# Patient Record
Sex: FEMALE | Race: WHITE | NOT HISPANIC OR LATINO | Employment: FULL TIME | URBAN - METROPOLITAN AREA
[De-identification: names, ages, dates, MRNs, and addresses within clinical notes are randomized per-mention and may not be internally consistent; named-entity substitution may affect disease eponyms.]

---

## 2023-08-26 PROBLEM — I49.1 PAC (PREMATURE ATRIAL CONTRACTION): Status: ACTIVE | Noted: 2023-08-26

## 2023-08-26 PROBLEM — K76.89 LIVER CYST: Status: ACTIVE | Noted: 2023-08-26

## 2023-08-26 PROBLEM — K76.9 LIVER LESION: Status: ACTIVE | Noted: 2023-08-26

## 2023-08-26 PROBLEM — L83 ACANTHOSIS NIGRICANS: Status: ACTIVE | Noted: 2023-08-26

## 2023-08-26 PROBLEM — R63.1 INCREASED THIRST: Status: ACTIVE | Noted: 2023-08-26

## 2023-08-26 PROBLEM — R73.01 IMPAIRED FASTING GLUCOSE: Status: ACTIVE | Noted: 2023-08-26

## 2023-08-26 PROBLEM — R07.89 CHEST WALL PAIN: Status: ACTIVE | Noted: 2023-08-26

## 2023-08-26 PROBLEM — J31.0 RHINITIS: Status: ACTIVE | Noted: 2023-08-26

## 2023-08-26 PROBLEM — I10 HYPERTENSION: Status: ACTIVE | Noted: 2023-08-26

## 2023-08-26 PROBLEM — E24.9 HYPERCORTISOLISM (MULTI): Status: ACTIVE | Noted: 2023-08-26

## 2023-08-26 PROBLEM — F41.9 ANXIETY: Status: ACTIVE | Noted: 2023-08-26

## 2023-08-26 PROBLEM — N30.10 INTERSTITIAL CYSTITIS: Status: ACTIVE | Noted: 2023-08-26

## 2023-08-26 PROBLEM — K21.9 GASTROESOPHAGEAL REFLUX DISEASE: Status: ACTIVE | Noted: 2023-08-26

## 2023-08-26 PROBLEM — E55.9 VITAMIN D DEFICIENCY: Status: ACTIVE | Noted: 2023-08-26

## 2023-08-26 PROBLEM — E78.5 HYPERLIPIDEMIA: Status: ACTIVE | Noted: 2023-08-26

## 2023-08-26 PROBLEM — E06.3 HASHIMOTO'S DISEASE: Status: ACTIVE | Noted: 2023-08-26

## 2023-08-26 PROBLEM — K76.0 NONALCOHOLIC FATTY LIVER: Status: ACTIVE | Noted: 2023-08-26

## 2023-08-26 PROBLEM — E11.9 DIABETES MELLITUS WITHOUT COMPLICATION (MULTI): Status: ACTIVE | Noted: 2023-08-26

## 2023-08-26 PROBLEM — R00.2 PALPITATIONS: Status: ACTIVE | Noted: 2023-08-26

## 2023-08-26 PROBLEM — E03.9 HYPOTHYROID: Status: ACTIVE | Noted: 2023-08-26

## 2023-08-26 PROBLEM — G47.00 INSOMNIA: Status: ACTIVE | Noted: 2023-08-26

## 2023-08-26 PROBLEM — G47.33 OBSTRUCTIVE SLEEP APNEA SYNDROME: Status: ACTIVE | Noted: 2023-08-26

## 2023-08-26 PROBLEM — D72.829 LEUKOCYTOSIS: Status: ACTIVE | Noted: 2023-08-26

## 2023-08-26 PROBLEM — E88.819 INSULIN RESISTANCE: Status: ACTIVE | Noted: 2023-08-26

## 2023-08-26 PROBLEM — E66.9 OBESITY: Status: ACTIVE | Noted: 2023-08-26

## 2023-08-26 PROBLEM — E28.2 PCOS (POLYCYSTIC OVARIAN SYNDROME): Status: ACTIVE | Noted: 2023-08-26

## 2023-08-26 RX ORDER — TRAZODONE HYDROCHLORIDE 50 MG/1
50 TABLET ORAL NIGHTLY
COMMUNITY
End: 2024-05-02 | Stop reason: SDUPTHER

## 2023-08-26 RX ORDER — THYROID 15 MG/1
1 TABLET ORAL DAILY
COMMUNITY
End: 2024-02-21 | Stop reason: SDUPTHER

## 2023-08-26 RX ORDER — METFORMIN HYDROCHLORIDE 500 MG/1
1000 TABLET, EXTENDED RELEASE ORAL DAILY
COMMUNITY
End: 2023-11-06

## 2023-08-26 RX ORDER — THYROID 30 MG/1
30 TABLET ORAL DAILY
COMMUNITY
End: 2024-02-21 | Stop reason: SDUPTHER

## 2023-08-26 RX ORDER — LEVONORGESTREL/ETHIN.ESTRADIOL 0.1-0.02MG
1 TABLET ORAL DAILY
COMMUNITY
Start: 2020-11-06 | End: 2023-10-20 | Stop reason: SDUPTHER

## 2023-08-26 RX ORDER — HYDROXYZINE HYDROCHLORIDE 10 MG/1
10 TABLET, FILM COATED ORAL NIGHTLY
COMMUNITY
End: 2024-05-02 | Stop reason: SDUPTHER

## 2023-08-26 RX ORDER — PROPRANOLOL HYDROCHLORIDE 20 MG/1
20 TABLET ORAL 2 TIMES DAILY
COMMUNITY
End: 2023-11-06

## 2023-08-26 RX ORDER — ZOLPIDEM TARTRATE 10 MG/1
10 TABLET ORAL NIGHTLY
COMMUNITY
End: 2023-10-20 | Stop reason: WASHOUT

## 2023-08-26 RX ORDER — OXYBUTYNIN CHLORIDE 10 MG/1
10 TABLET, EXTENDED RELEASE ORAL DAILY
COMMUNITY
Start: 2022-11-04 | End: 2023-12-02

## 2023-08-26 RX ORDER — SEMAGLUTIDE 2.68 MG/ML
2 INJECTION, SOLUTION SUBCUTANEOUS
COMMUNITY
Start: 2023-02-10 | End: 2024-05-02 | Stop reason: SDUPTHER

## 2023-08-26 RX ORDER — TRIAMCINOLONE ACETONIDE 1 MG/G
1 CREAM TOPICAL 2 TIMES DAILY
COMMUNITY
Start: 2019-04-05 | End: 2024-05-02 | Stop reason: ALTCHOICE

## 2023-10-18 NOTE — PROGRESS NOTES
Annual  Subjective   Danica Gonzalez is a 32 y.o. female who is here for a routine exam.     Complaints: none  Periods: regular  Dysmenorrhea: none    Current contraception: OCP  History of abnormal Pap smear: no  History of abnormal mammogram: no      OB History    No obstetric history on file.          Review of Systems   Constitutional:  Negative for chills, fatigue, fever and unexpected weight change.   Respiratory:  Negative for cough and shortness of breath.    Gastrointestinal:  Negative for abdominal pain, nausea and vomiting.   Genitourinary:  Negative for dyspareunia, dysuria, pelvic pain and vaginal discharge.   Skin:  Negative for color change and rash.   Neurological:  Negative for dizziness and headaches.         Objective   There were no vitals taken for this visit.       General:   Alert and oriented, in no acute distress   Neck: Supple. No visible thyromegaly.    Breast/Axilla: Normal to palpation bilaterally without masses, skin changes, or nipple discharge.    Abdomen: Soft, non-tender, without masses or organomegaly   Vulva: Normal architecture without erythema, masses, or lesions.    Vagina: Normal mucosa without lesions, masses, or atrophy. No abnormal vaginal discharge.    Cervix: Normal without masses, lesions, or signs of cervicitis   Uterus: Normal, mobile, non-enlarged uterus   Adnexa: Normal without masses or lesions   Pelvic Floor normal   Psych Normal affect. Normal mood.      Assessment/Plan   Diagnoses and all orders for this visit:  Encounter for gynecological examination without abnormal finding    Routine annual    Pap not due    Juliann Crespo MD    hand

## 2023-10-20 ENCOUNTER — OFFICE VISIT (OUTPATIENT)
Dept: OBSTETRICS AND GYNECOLOGY | Facility: CLINIC | Age: 33
End: 2023-10-20
Payer: COMMERCIAL

## 2023-10-20 VITALS
SYSTOLIC BLOOD PRESSURE: 112 MMHG | WEIGHT: 143 LBS | BODY MASS INDEX: 23.82 KG/M2 | HEIGHT: 65 IN | DIASTOLIC BLOOD PRESSURE: 64 MMHG

## 2023-10-20 DIAGNOSIS — Z01.419 ENCOUNTER FOR GYNECOLOGICAL EXAMINATION WITHOUT ABNORMAL FINDING: Primary | ICD-10-CM

## 2023-10-20 PROCEDURE — 99395 PREV VISIT EST AGE 18-39: CPT | Performed by: OBSTETRICS & GYNECOLOGY

## 2023-10-20 PROCEDURE — 3078F DIAST BP <80 MM HG: CPT | Performed by: OBSTETRICS & GYNECOLOGY

## 2023-10-20 PROCEDURE — 3074F SYST BP LT 130 MM HG: CPT | Performed by: OBSTETRICS & GYNECOLOGY

## 2023-10-20 PROCEDURE — 1036F TOBACCO NON-USER: CPT | Performed by: OBSTETRICS & GYNECOLOGY

## 2023-10-20 RX ORDER — LEVONORGESTREL/ETHIN.ESTRADIOL 0.1-0.02MG
1 TABLET ORAL DAILY
Qty: 90 TABLET | Refills: 3 | Status: SHIPPED | OUTPATIENT
Start: 2023-10-20 | End: 2024-10-19

## 2023-10-20 ASSESSMENT — ENCOUNTER SYMPTOMS
SHORTNESS OF BREATH: 0
ABDOMINAL PAIN: 0
FATIGUE: 0
DYSURIA: 0
COUGH: 0
NAUSEA: 0
CHILLS: 0
DIZZINESS: 0
HEADACHES: 0
VOMITING: 0
FEVER: 0
UNEXPECTED WEIGHT CHANGE: 0
COLOR CHANGE: 0

## 2023-10-20 ASSESSMENT — PATIENT HEALTH QUESTIONNAIRE - PHQ9
2. FEELING DOWN, DEPRESSED OR HOPELESS: NOT AT ALL
SUM OF ALL RESPONSES TO PHQ9 QUESTIONS 1 & 2: 0
1. LITTLE INTEREST OR PLEASURE IN DOING THINGS: NOT AT ALL

## 2023-10-20 ASSESSMENT — LIFESTYLE VARIABLES
HOW OFTEN DO YOU HAVE A DRINK CONTAINING ALCOHOL: 2-4 TIMES A MONTH
AUDIT-C TOTAL SCORE: 3
SKIP TO QUESTIONS 9-10: 0
HOW OFTEN DO YOU HAVE SIX OR MORE DRINKS ON ONE OCCASION: NEVER
HOW MANY STANDARD DRINKS CONTAINING ALCOHOL DO YOU HAVE ON A TYPICAL DAY: 3 OR 4

## 2023-10-20 ASSESSMENT — PAIN SCALES - GENERAL: PAINLEVEL: 0-NO PAIN

## 2023-10-20 NOTE — LETTER
October 20, 2023     Patient: Danica Gonzalez   YOB: 1990   Date of Visit: 10/20/2023       To Whom It May Concern:    Danica Gonzalez was seen in my clinic on 10/20/2023 at 1:30 pm. Please excuse Danica for her absence from work on this day to make the appointment.    If you have any questions or concerns, please don't hesitate to call.         Sincerely,         Juliann Crespo MD        CC: No Recipients

## 2023-10-26 ENCOUNTER — APPOINTMENT (OUTPATIENT)
Dept: OBSTETRICS AND GYNECOLOGY | Facility: CLINIC | Age: 33
End: 2023-10-26
Payer: COMMERCIAL

## 2024-01-24 DIAGNOSIS — R73.9 HYPERGLYCEMIA, UNSPECIFIED: ICD-10-CM

## 2024-01-25 ENCOUNTER — APPOINTMENT (OUTPATIENT)
Dept: HEMATOLOGY/ONCOLOGY | Facility: CLINIC | Age: 34
End: 2024-01-25
Payer: COMMERCIAL

## 2024-01-25 RX ORDER — METFORMIN HYDROCHLORIDE 500 MG/1
1000 TABLET, EXTENDED RELEASE ORAL 2 TIMES DAILY
Qty: 360 TABLET | Refills: 1 | Status: SHIPPED | OUTPATIENT
Start: 2024-01-25 | End: 2024-02-21 | Stop reason: WASHOUT

## 2024-01-29 DIAGNOSIS — D72.829 LEUKOCYTOSIS, UNSPECIFIED TYPE: Primary | ICD-10-CM

## 2024-02-01 ENCOUNTER — APPOINTMENT (OUTPATIENT)
Dept: LAB | Facility: CLINIC | Age: 34
End: 2024-02-01
Payer: COMMERCIAL

## 2024-02-01 ENCOUNTER — OFFICE VISIT (OUTPATIENT)
Dept: HEMATOLOGY/ONCOLOGY | Facility: CLINIC | Age: 34
End: 2024-02-01
Payer: COMMERCIAL

## 2024-02-01 VITALS
HEART RATE: 84 BPM | OXYGEN SATURATION: 97 % | RESPIRATION RATE: 18 BRPM | SYSTOLIC BLOOD PRESSURE: 100 MMHG | BODY MASS INDEX: 22.31 KG/M2 | WEIGHT: 134.04 LBS | DIASTOLIC BLOOD PRESSURE: 71 MMHG | TEMPERATURE: 97.3 F

## 2024-02-01 DIAGNOSIS — D72.829 LEUKOCYTOSIS, UNSPECIFIED TYPE: Primary | ICD-10-CM

## 2024-02-01 LAB
BASOPHILS # BLD AUTO: 0.05 X10*3/UL (ref 0–0.1)
BASOPHILS NFR BLD AUTO: 0.6 %
EOSINOPHIL # BLD AUTO: 0.14 X10*3/UL (ref 0–0.7)
EOSINOPHIL NFR BLD AUTO: 1.6 %
ERYTHROCYTE [DISTWIDTH] IN BLOOD BY AUTOMATED COUNT: 13.2 % (ref 11.5–14.5)
HCT VFR BLD AUTO: 40.3 % (ref 36–46)
HGB BLD-MCNC: 13.4 G/DL (ref 12–16)
IMM GRANULOCYTES # BLD AUTO: 0.02 X10*3/UL (ref 0–0.7)
IMM GRANULOCYTES NFR BLD AUTO: 0.2 % (ref 0–0.9)
LYMPHOCYTES # BLD AUTO: 2.35 X10*3/UL (ref 1.2–4.8)
LYMPHOCYTES NFR BLD AUTO: 27 %
MCH RBC QN AUTO: 29.5 PG (ref 26–34)
MCHC RBC AUTO-ENTMCNC: 33.3 G/DL (ref 32–36)
MCV RBC AUTO: 89 FL (ref 80–100)
MONOCYTES # BLD AUTO: 0.69 X10*3/UL (ref 0.1–1)
MONOCYTES NFR BLD AUTO: 7.9 %
NEUTROPHILS # BLD AUTO: 5.44 X10*3/UL (ref 1.2–7.7)
NEUTROPHILS NFR BLD AUTO: 62.7 %
NRBC BLD-RTO: 0 /100 WBCS (ref 0–0)
PLATELET # BLD AUTO: 222 X10*3/UL (ref 150–450)
RBC # BLD AUTO: 4.55 X10*6/UL (ref 4–5.2)
WBC # BLD AUTO: 8.7 X10*3/UL (ref 4.4–11.3)

## 2024-02-01 PROCEDURE — 1036F TOBACCO NON-USER: CPT | Performed by: NURSE PRACTITIONER

## 2024-02-01 PROCEDURE — 36415 COLL VENOUS BLD VENIPUNCTURE: CPT | Performed by: NURSE PRACTITIONER

## 2024-02-01 PROCEDURE — 3078F DIAST BP <80 MM HG: CPT | Performed by: NURSE PRACTITIONER

## 2024-02-01 PROCEDURE — 3074F SYST BP LT 130 MM HG: CPT | Performed by: NURSE PRACTITIONER

## 2024-02-01 PROCEDURE — 99213 OFFICE O/P EST LOW 20 MIN: CPT | Performed by: NURSE PRACTITIONER

## 2024-02-01 PROCEDURE — 85025 COMPLETE CBC W/AUTO DIFF WBC: CPT | Performed by: NURSE PRACTITIONER

## 2024-02-01 RX ORDER — VIT C/E/ZN/COPPR/LUTEIN/ZEAXAN 250MG-90MG
25 CAPSULE ORAL DAILY
COMMUNITY

## 2024-02-01 ASSESSMENT — PAIN SCALES - GENERAL: PAINLEVEL: 0-NO PAIN

## 2024-02-01 ASSESSMENT — ENCOUNTER SYMPTOMS
OCCASIONAL FEELINGS OF UNSTEADINESS: 0
LOSS OF SENSATION IN FEET: 0
DEPRESSION: 0

## 2024-02-01 ASSESSMENT — PATIENT HEALTH QUESTIONNAIRE - PHQ9
SUM OF ALL RESPONSES TO PHQ9 QUESTIONS 1 AND 2: 0
2. FEELING DOWN, DEPRESSED OR HOPELESS: NOT AT ALL
1. LITTLE INTEREST OR PLEASURE IN DOING THINGS: NOT AT ALL

## 2024-02-01 ASSESSMENT — COLUMBIA-SUICIDE SEVERITY RATING SCALE - C-SSRS
1. IN THE PAST MONTH, HAVE YOU WISHED YOU WERE DEAD OR WISHED YOU COULD GO TO SLEEP AND NOT WAKE UP?: NO
2. HAVE YOU ACTUALLY HAD ANY THOUGHTS OF KILLING YOURSELF?: NO

## 2024-02-01 NOTE — PROGRESS NOTES
Patient ID: Danica Gonzalez is a 33 y.o. female.    Hematology    Leukocytosis:   - WBC 13.7 with an absolute neutrophil count of 9.69 and the absolute eosinophil count was 0.61   - WBC 13.4 with an absolute neutrophil count of 9.10 and eosinophil count at 1.0.   2020 - WBC 15.2 with an absolute neutrophil count at 10.1 and eosinophil count at 0.86.   2020 - WBC 12 with an absolute neutrophil count of 7.87.  2020 - WBC 11.5 but otherwise with normal neutrophils, lymphocytes eosinophils, monocytes and basophils.  2022 - WBC 15.4 neutrophil 10.26 otherwise WNL  2023 - WBC 12.2 neutrophil 8.52 otherwise WNL  2024 - WBC 8.7 ANC 5440 (CBCd WNL)     Interval History:  Patient presents today for routine follow up evaluation for history of leukocytosis.  We have reviewed her counts dating back to .  No recent infections. She  has not had any hospitalization.  She denies any fever, chills or night sweats.  No cough, chest pain or shortness of breath.  No nausea or vomiting.  No constipation or diarrhea.     Review of Systems:  A review of systems has been completed and are negative for complaints except what is stated in the HPI and/or past medical history    Past Medical History:  Tobacco abuse (quit 2020 now smoking socially), hypertension, anxiety, hypothyroidism, insomnia, PCOS. COVID 2021.    Past Surgical History:  Tonsillectomy     Allergies:   NKDA    Medications:  Medication list reviewed with patient and updated in EMR    Social History:   The patient quit smoking in 2020 after 15 years. She drinks alcohol occasionally   social smoking weekends.  vapes    Vital Signs:   /71 (BP Location: Left arm, Patient Position: Sitting, BP Cuff Size: Adult long)   Pulse 84   Temp 36.3 °C (97.3 °F) (Temporal)   Resp 18   Wt 60.8 kg (134 lb 0.6 oz)   SpO2 97%   BMI 22.31 kg/m²     Physical Exam:  ECO  Pain: 0  Constitutional: Well developed,  awake/alert/oriented x3, no distress, alert and cooperative  Eyes: PER. sclera anicteric  ENMT: Oral mucosa moist  Respiratory/Thorax: Breathing is non-labored. Lungs are clear to auscultation bilaterally. No adventitious breath sounds  Cardiovascular: S1-S2. Regular rate and rhythm. No murmurs, rubs, or gallops appreciated  Gastrointestinal: Abdomen soft nontender, nondistended, normal active bowel sounds.   Musculoskeletal: ROM intact, no joint swelling, normal strength  Extremities: normal extremities, no cyanosis, no edema, no clubbing  Neurologic: alert and oriented x3. Nonfocal exam. No myoclonus  Psychological: Pleasant, appropriate and easily engaged     Lab Results:  February 1, 2024   WBC 8.7, hemoglobin 13.4, hematocrit 40.3, platelets 222,000, ANC 5440     Assessment:  Chronic leukocytosis  - currently WNL     History of tobacco abuse/use    Plan:   - 1 year follow-up  - CBCd on return     ANUM Garcia-CNP

## 2024-02-12 ENCOUNTER — APPOINTMENT (OUTPATIENT)
Dept: PRIMARY CARE | Facility: CLINIC | Age: 34
End: 2024-02-12
Payer: COMMERCIAL

## 2024-02-12 ENCOUNTER — LAB (OUTPATIENT)
Dept: LAB | Facility: LAB | Age: 34
End: 2024-02-12
Payer: COMMERCIAL

## 2024-02-12 DIAGNOSIS — E06.3 AUTOIMMUNE THYROIDITIS: Primary | ICD-10-CM

## 2024-02-12 LAB
T3FREE SERPL-MCNC: 3.4 PG/ML (ref 2.3–4.2)
TSH SERPL-ACNC: 3.44 MIU/L (ref 0.44–3.98)

## 2024-02-12 PROCEDURE — 36415 COLL VENOUS BLD VENIPUNCTURE: CPT

## 2024-02-12 PROCEDURE — 84481 FREE ASSAY (FT-3): CPT

## 2024-02-12 PROCEDURE — 84443 ASSAY THYROID STIM HORMONE: CPT

## 2024-02-20 NOTE — PROGRESS NOTES
HPI   33 yo with hashimoto's disease here for yearly f/u.           Pt been on synthetic thyroid hormone in the past, transitioned to dessicated thyroid hormone and felt better. Has gone back and forth with pcp and endo's over the years to regulate.           Taking armour 30mg + 15mg, normal labs see below.   -no obstructive sx, euthyroid by history   -remains on ocp     -taking ozempic 2mg (wt nearly 240lbs, today 134lbs)  -having normal periods, still takes metformin and not always tolerating it        Current Outpatient Medications:     cholecalciferol (Vitamin D-3) 25 MCG (1000 UT) capsule, Take 1 capsule (25 mcg) by mouth once daily., Disp: , Rfl:     hydrOXYzine HCL (Atarax) 10 mg tablet, Take 1 tablet (10 mg) by mouth once daily at bedtime., Disp: , Rfl:     levonorgestreL-ethinyl estrad ([place]) 0.1-20 mg-mcg tablet, Take 1 tablet by mouth once daily., Disp: 90 tablet, Rfl: 3    oxybutynin XL (Ditropan-XL) 10 mg 24 hr tablet, Take 1 tablet (10 mg) by mouth once daily., Disp: 90 tablet, Rfl: 3    semaglutide (Ozempic) 2 mg/dose (8 mg/3 mL) pen injector, Take 2 mg by mouth 1 (one) time per week., Disp: , Rfl:     thyroid, pork, ([place] Thyroid) 15 mg tablet, Take 1 tablet (15 mg) by mouth once daily. on an empty stomach, Disp: , Rfl:     thyroid, pork, ([place] Thyroid) 30 mg tablet, Take 1 tablet (30 mg) by mouth once daily. on an empty stomach, Disp: , Rfl:     traZODone (Desyrel) 50 mg tablet, Take 1 tablet (50 mg) by mouth once daily at bedtime., Disp: , Rfl:     triamcinolone (Kenalog) 0.1 % cream, Apply 1 Application topically 2 times a day., Disp: , Rfl:     vitamin E mixed (VITAMIN E COMPLEX ORAL), Vitamin E Complex, Disp: , Rfl:     Xiidra 5 % dropperette, INSTILL 1 DROP IN THE RIGHT EYE TWICE DAILY, Disp: , Rfl:     propranolol (Inderal) 20 mg tablet, TAKE 1 TABLET BY MOUTH TWICE A DAY FOR 90 DAYS, Disp: 180 tablet, Rfl: 3      Allergies as of 02/21/2024 - Reviewed 02/21/2024   Allergen Reaction  "Noted    Buspirone Anxiety 08/26/2023         Review of Systems   Cardiology: Lightheadedness-denies.  Chest pain-denies.  Leg edema-denies.  Palpitations-denies.  Respiratory: Cough-denies. Shortness of breath-denies.  Wheezing-denies.  Gastroenterology: Constipation-denies.  Diarrhea-at times.  Heartburn-denies.  Endocrinology: Cold intolerance-denies.  Heat intolerance-denies.  Sweats-denies.  Neurology: Headache-denies.  Tremor-denies.  Neuropathy in extremities-denies.  Psychology: Low energy-denies.  Irritability-denies.  Sleep disturbances-denies.      BP 95/68 (BP Location: Left arm)   Pulse 78   Wt 59.9 kg (132 lb)   BMI 21.97 kg/m²       Labs:  Lab Results   Component Value Date    WBC 8.7 02/01/2024    NRBC 0.0 02/01/2024    RBC 4.55 02/01/2024    HGB 13.4 02/01/2024    HCT 40.3 02/01/2024     02/01/2024     Lab Results   Component Value Date    CALCIUM 9.1 11/04/2022    AST 20 02/26/2022    ALKPHOS 72 02/26/2022    BILITOT 0.4 02/26/2022    PROT 6.5 02/26/2022    ALBUMIN 4.0 02/26/2022    GLOB 2.5 02/26/2022    AGR 1.6 02/26/2022     11/04/2022    K 4.5 11/04/2022     11/04/2022    CO2 20 (L) 11/04/2022    ANIONGAP 16 11/04/2022    BUN 10 11/04/2022    CREATININE 0.7 11/04/2022    UREACREAUR 14.3 11/04/2022    GLUCOSE 85 11/04/2022    ALT 32 02/26/2022    EGFR 118 11/04/2022     Lab Results   Component Value Date    CHOL 202 (H) 02/10/2023    TRIG 156 (H) 02/10/2023    HDL 35 (L) 02/10/2023    LDLCALC 136 (H) 02/10/2023     No results found for: \"MICROALBCREA\"  Lab Results   Component Value Date    TSH 3.44 02/12/2024     Lab Results   Component Value Date    DENFPKVW54 992 (H) 03/08/2019     Lab Results   Component Value Date    HGBA1C 5.3 02/26/2022         Physical Exam   General Appearance: pleasant, cooperative, no acute distress  HEENT: no chemosis, no proptosis, no lid lag, no lid retraction  Neck: no lymphadenopathy, no thyromegaly, no dominant thyroid nodules  Heart: no " murmurs, regular rate and rhythm, S1 and S2  Lungs: no wheezes, no rhonci, no rales  Extremities: no lower extremity swelling      Assessment/Plan   1. Hypothyroidism, unspecified type  -euthyroid on therapy, continue medications and follow levels yearly  -thyroid labs ordered for next year  -would consider holding metformin as periods have normalized with wt loss    Follow Up:  Chung 1 year    -labs/tests/notes reviewed  -reviewed and counseled patient on medication monitoring and side effects

## 2024-02-21 ENCOUNTER — OFFICE VISIT (OUTPATIENT)
Dept: ENDOCRINOLOGY | Facility: CLINIC | Age: 34
End: 2024-02-21
Payer: COMMERCIAL

## 2024-02-21 VITALS
DIASTOLIC BLOOD PRESSURE: 68 MMHG | WEIGHT: 132 LBS | SYSTOLIC BLOOD PRESSURE: 95 MMHG | BODY MASS INDEX: 21.97 KG/M2 | HEART RATE: 78 BPM

## 2024-02-21 DIAGNOSIS — E03.9 HYPOTHYROIDISM, UNSPECIFIED TYPE: Primary | ICD-10-CM

## 2024-02-21 PROCEDURE — 3074F SYST BP LT 130 MM HG: CPT | Performed by: INTERNAL MEDICINE

## 2024-02-21 PROCEDURE — 99213 OFFICE O/P EST LOW 20 MIN: CPT | Performed by: INTERNAL MEDICINE

## 2024-02-21 PROCEDURE — 3078F DIAST BP <80 MM HG: CPT | Performed by: INTERNAL MEDICINE

## 2024-02-21 RX ORDER — THYROID 15 MG/1
15 TABLET ORAL DAILY
Qty: 90 TABLET | Refills: 3 | Status: SHIPPED | OUTPATIENT
Start: 2024-02-21 | End: 2024-05-02 | Stop reason: SDUPTHER

## 2024-02-21 RX ORDER — THYROID 30 MG/1
30 TABLET ORAL DAILY
Qty: 90 TABLET | Refills: 3 | Status: SHIPPED | OUTPATIENT
Start: 2024-02-21 | End: 2024-05-02 | Stop reason: SDUPTHER

## 2024-02-21 RX ORDER — LIFITEGRAST 50 MG/ML
SOLUTION/ DROPS OPHTHALMIC
COMMUNITY
Start: 2023-11-01

## 2024-02-21 ASSESSMENT — PATIENT HEALTH QUESTIONNAIRE - PHQ9
2. FEELING DOWN, DEPRESSED OR HOPELESS: NOT AT ALL
1. LITTLE INTEREST OR PLEASURE IN DOING THINGS: NOT AT ALL
SUM OF ALL RESPONSES TO PHQ9 QUESTIONS 1 & 2: 0

## 2024-02-21 ASSESSMENT — ENCOUNTER SYMPTOMS
LOSS OF SENSATION IN FEET: 0
OCCASIONAL FEELINGS OF UNSTEADINESS: 0
DEPRESSION: 0

## 2024-02-21 ASSESSMENT — PAIN SCALES - GENERAL: PAINLEVEL: 0-NO PAIN

## 2024-03-07 ENCOUNTER — TELEPHONE (OUTPATIENT)
Dept: ENDOCRINOLOGY | Facility: CLINIC | Age: 34
End: 2024-03-07
Payer: COMMERCIAL

## 2024-03-07 NOTE — TELEPHONE ENCOUNTER
Danica Gonzalez   1990   [id number]   [phone]       [facility] sent a request for PA for [place] Thyroid 15mg and 30mg. Message sent to PA Team in regards to this.

## 2024-03-08 ENCOUNTER — TELEPHONE (OUTPATIENT)
Dept: ENDOCRINOLOGY | Facility: CLINIC | Age: 34
End: 2024-03-08
Payer: COMMERCIAL

## 2024-05-02 ENCOUNTER — OFFICE VISIT (OUTPATIENT)
Dept: PRIMARY CARE | Facility: CLINIC | Age: 34
End: 2024-05-02
Payer: COMMERCIAL

## 2024-05-02 VITALS
HEART RATE: 85 BPM | HEIGHT: 65 IN | RESPIRATION RATE: 16 BRPM | WEIGHT: 128 LBS | OXYGEN SATURATION: 96 % | DIASTOLIC BLOOD PRESSURE: 70 MMHG | SYSTOLIC BLOOD PRESSURE: 120 MMHG | BODY MASS INDEX: 21.33 KG/M2

## 2024-05-02 DIAGNOSIS — Z11.3 SCREENING FOR STD (SEXUALLY TRANSMITTED DISEASE): ICD-10-CM

## 2024-05-02 DIAGNOSIS — R39.15 URGENCY OF URINATION: ICD-10-CM

## 2024-05-02 DIAGNOSIS — Z00.00 ROUTINE GENERAL MEDICAL EXAMINATION AT A HEALTH CARE FACILITY: Primary | ICD-10-CM

## 2024-05-02 DIAGNOSIS — I10 ESSENTIAL (PRIMARY) HYPERTENSION: ICD-10-CM

## 2024-05-02 DIAGNOSIS — F51.01 PRIMARY INSOMNIA: ICD-10-CM

## 2024-05-02 DIAGNOSIS — E03.9 HYPOTHYROIDISM, UNSPECIFIED TYPE: ICD-10-CM

## 2024-05-02 DIAGNOSIS — E88.819 INSULIN RESISTANCE: ICD-10-CM

## 2024-05-02 DIAGNOSIS — F41.9 ANXIETY: ICD-10-CM

## 2024-05-02 PROCEDURE — 4004F PT TOBACCO SCREEN RCVD TLK: CPT | Performed by: INTERNAL MEDICINE

## 2024-05-02 PROCEDURE — 3074F SYST BP LT 130 MM HG: CPT | Performed by: INTERNAL MEDICINE

## 2024-05-02 PROCEDURE — 3078F DIAST BP <80 MM HG: CPT | Performed by: INTERNAL MEDICINE

## 2024-05-02 PROCEDURE — 99395 PREV VISIT EST AGE 18-39: CPT | Performed by: INTERNAL MEDICINE

## 2024-05-02 RX ORDER — SEMAGLUTIDE 2.68 MG/ML
2 INJECTION, SOLUTION SUBCUTANEOUS
Qty: 3 ML | Refills: 11 | Status: SHIPPED | OUTPATIENT
Start: 2024-05-05 | End: 2025-05-05

## 2024-05-02 RX ORDER — OXYBUTYNIN CHLORIDE 10 MG/1
10 TABLET, EXTENDED RELEASE ORAL DAILY
Qty: 90 TABLET | Refills: 3 | Status: SHIPPED | OUTPATIENT
Start: 2024-05-02 | End: 2025-05-02

## 2024-05-02 RX ORDER — THYROID 15 MG/1
15 TABLET ORAL DAILY
Qty: 90 TABLET | Refills: 3 | Status: SHIPPED | OUTPATIENT
Start: 2024-05-02

## 2024-05-02 RX ORDER — PROPRANOLOL HYDROCHLORIDE 20 MG/1
20 TABLET ORAL 2 TIMES DAILY
Qty: 180 TABLET | Refills: 3 | Status: SHIPPED | OUTPATIENT
Start: 2024-05-02 | End: 2025-05-02

## 2024-05-02 RX ORDER — HYDROXYZINE HYDROCHLORIDE 10 MG/1
10 TABLET, FILM COATED ORAL NIGHTLY
Qty: 90 TABLET | Refills: 3 | Status: SHIPPED | OUTPATIENT
Start: 2024-05-02 | End: 2025-05-02

## 2024-05-02 RX ORDER — THYROID 30 MG/1
30 TABLET ORAL DAILY
Qty: 90 TABLET | Refills: 3 | Status: SHIPPED | OUTPATIENT
Start: 2024-05-02

## 2024-05-02 RX ORDER — TRAZODONE HYDROCHLORIDE 50 MG/1
50 TABLET ORAL NIGHTLY
Qty: 90 TABLET | Refills: 3 | Status: SHIPPED | OUTPATIENT
Start: 2024-05-02 | End: 2025-05-02

## 2024-05-02 ASSESSMENT — ENCOUNTER SYMPTOMS
SHORTNESS OF BREATH: 0
OCCASIONAL FEELINGS OF UNSTEADINESS: 0
FATIGUE: 0
DIZZINESS: 0
FEVER: 0
DIARRHEA: 0
ARTHRALGIAS: 0
CONSTIPATION: 0
WEAKNESS: 0
SINUS PAIN: 0
SLEEP DISTURBANCE: 0
RHINORRHEA: 0
DEPRESSION: 0
ABDOMINAL PAIN: 0
PALPITATIONS: 0
VOMITING: 0
NERVOUS/ANXIOUS: 0
HEMATURIA: 0
APPETITE CHANGE: 0
LOSS OF SENSATION IN FEET: 0
FREQUENCY: 0
CHILLS: 0
NAUSEA: 0
DIFFICULTY URINATING: 0
HEADACHES: 0
COUGH: 0
JOINT SWELLING: 0
SORE THROAT: 0

## 2024-05-02 ASSESSMENT — PAIN SCALES - GENERAL: PAINLEVEL: 0-NO PAIN

## 2024-05-02 ASSESSMENT — PATIENT HEALTH QUESTIONNAIRE - PHQ9
SUM OF ALL RESPONSES TO PHQ9 QUESTIONS 1 AND 2: 0
1. LITTLE INTEREST OR PLEASURE IN DOING THINGS: NOT AT ALL
2. FEELING DOWN, DEPRESSED OR HOPELESS: NOT AT ALL

## 2024-05-02 NOTE — LETTER
May 2, 2024     Patient: Danica Gonzalez   YOB: 1990   Date of Visit: 5/2/2024       To Whom It May Concern:    Danica Gonzalez was seen in my clinic on 5/2/2024 at 1:30 pm. Please excuse Danica for her absence from work on this day to make the appointment.    If you have any questions or concerns, please don't hesitate to call.         Sincerely,         Monica Briggs MD        CC: No Recipients

## 2024-05-02 NOTE — LETTER
TO WHOM IT MAY CONCERN,                                                    MAY 2 , 2024    CRISS AUSTIN   ( 1990)    SHE IS A CURRENT PATIENT IN MY PRACTICE AND MONITORED CLOSELY. IT IS IN MY OPINION THAT SHE WOULD BENEFIT GREATLY FROM THE USE AND POSSESSION OF A THERAPY DOG .     KARLA CASEY MD

## 2024-05-02 NOTE — PROGRESS NOTES
"Subjective   Patient ID: Danica Gonzalez is a 33 y.o. female who presents for routine medical exam. Patient reports she is feeling well. Dr Kelsey told her she has insulin resistance and PCOS in 2020 because she had dark armpits, ovarian cyst, she takes Metformin and Ozempic. She lost weight watching diet and exercising. She had high stress due to divorce, wants to be checked for STDs. Takes Metoprolol as needed for palpitations. Oxybutynin helps with urinary urgency. She is c/o anxiety, wants to see a therapist     Diagnostics Reviewed:  Labs Reviewed:         Review of Systems   Constitutional:  Negative for appetite change, chills, fatigue and fever.   HENT:  Negative for ear pain, rhinorrhea, sinus pain and sore throat.    Eyes:  Negative for visual disturbance.   Respiratory:  Negative for cough and shortness of breath.    Cardiovascular:  Negative for chest pain and palpitations.   Gastrointestinal:  Negative for abdominal pain, constipation, diarrhea, nausea and vomiting.   Genitourinary:  Negative for difficulty urinating, frequency and hematuria.   Musculoskeletal:  Negative for arthralgias and joint swelling.   Skin:  Negative for rash.   Neurological:  Negative for dizziness, weakness and headaches.   Psychiatric/Behavioral:  Negative for sleep disturbance. The patient is not nervous/anxious.        Objective   /70   Pulse 85   Resp 16   Ht 1.651 m (5' 5\")   Wt 58.1 kg (128 lb)   LMP 04/24/2024   SpO2 96%   BMI 21.30 kg/m²     Physical Exam  HENT:      Right Ear: Tympanic membrane normal.      Left Ear: Tympanic membrane normal.      Mouth/Throat:      Pharynx: Oropharynx is clear. No oropharyngeal exudate.   Eyes:      Conjunctiva/sclera: Conjunctivae normal.      Pupils: Pupils are equal, round, and reactive to light.   Neck:      Thyroid: No thyromegaly.      Vascular: No carotid bruit.   Cardiovascular:      Rate and Rhythm: Regular rhythm.      Heart sounds: Normal heart sounds. "   Pulmonary:      Breath sounds: Normal breath sounds.   Abdominal:      Palpations: Abdomen is soft. There is no hepatomegaly.      Tenderness: There is no abdominal tenderness.   Musculoskeletal:      Right hip: Normal.      Left hip: Normal.      Right knee: Normal.      Left knee: Normal.      Right lower leg: No edema.      Left lower leg: No edema.   Lymphadenopathy:      Cervical: No cervical adenopathy.   Skin:     General: Skin is warm.      Comments: No suspicious moles   Neurological:      Mental Status: She is alert and oriented to person, place, and time.      Gait: Gait is intact.   Psychiatric:         Mood and Affect: Mood and affect normal.         Behavior: Behavior normal. Behavior is cooperative.         Cognition and Memory: Cognition normal.         Assessment/Plan   Diagnoses and all orders for this visit:  Routine general medical examination at a health care facility  -     HIV 1/2 Antigen/Antibody Screen with Reflex to Confirmation; Future  -     Hepatitis B core antibody, total; Future  -     Hepatitis C antibody; Future  -     Hepatitis B surface antigen; Future  -     Syphilis Screen with Reflex; Future  Urgency of urination  -     oxybutynin XL (Ditropan-XL) 10 mg 24 hr tablet; Take 1 tablet (10 mg) by mouth once daily.  -     HIV 1/2 Antigen/Antibody Screen with Reflex to Confirmation; Future  -     Hepatitis B core antibody, total; Future  -     Hepatitis C antibody; Future  -     Hepatitis B surface antigen; Future  -     Syphilis Screen with Reflex; Future  Essential (primary) hypertension  -     propranolol (Inderal) 20 mg tablet; Take 1 tablet (20 mg) by mouth 2 times a day.  -     HIV 1/2 Antigen/Antibody Screen with Reflex to Confirmation; Future  -     Hepatitis B core antibody, total; Future  -     Hepatitis C antibody; Future  -     Hepatitis B surface antigen; Future  -     Syphilis Screen with Reflex; Future  Hypothyroidism, unspecified type  -     thyroid, pork, ([place]  Thyroid) 15 mg tablet; Take 1 tablet (15 mg) by mouth once daily. on an empty stomach  -     thyroid, pork, ([place] Thyroid) 30 mg tablet; Take 1 tablet (30 mg) by mouth once daily. on an empty stomach  -     HIV 1/2 Antigen/Antibody Screen with Reflex to Confirmation; Future  -     Hepatitis B core antibody, total; Future  -     Hepatitis C antibody; Future  -     Hepatitis B surface antigen; Future  -     Syphilis Screen with Reflex; Future  Anxiety  -     hydrOXYzine HCL (Atarax) 10 mg tablet; Take 1 tablet (10 mg) by mouth once daily at bedtime.  -     HIV 1/2 Antigen/Antibody Screen with Reflex to Confirmation; Future  -     Hepatitis B core antibody, total; Future  -     Hepatitis C antibody; Future  -     Hepatitis B surface antigen; Future  -     Syphilis Screen with Reflex; Future  Primary insomnia  -     traZODone (Desyrel) 50 mg tablet; Take 1 tablet (50 mg) by mouth once daily at bedtime.  -     HIV 1/2 Antigen/Antibody Screen with Reflex to Confirmation; Future  -     Hepatitis B core antibody, total; Future  -     Hepatitis C antibody; Future  -     Hepatitis B surface antigen; Future  -     Syphilis Screen with Reflex; Future  Insulin resistance  -     semaglutide (Ozempic) 2 mg/dose (8 mg/3 mL) pen injector; Take 2 mg by mouth 1 (one) time per week.  -     HIV 1/2 Antigen/Antibody Screen with Reflex to Confirmation; Future  -     Hepatitis B core antibody, total; Future  -     Hepatitis C antibody; Future  -     Hepatitis B surface antigen; Future  -     Syphilis Screen with Reflex; Future  -     Hemoglobin A1C; Future  Screening for STD (sexually transmitted disease)  -     HIV 1/2 Antigen/Antibody Screen with Reflex to Confirmation; Future  -     Hepatitis B core antibody, total; Future  -     Hepatitis C antibody; Future  -     Hepatitis B surface antigen; Future  -     Syphilis Screen with Reflex; Future

## 2024-05-10 ENCOUNTER — LAB (OUTPATIENT)
Dept: LAB | Facility: LAB | Age: 34
End: 2024-05-10
Payer: COMMERCIAL

## 2024-05-10 DIAGNOSIS — Z00.00 ROUTINE GENERAL MEDICAL EXAMINATION AT A HEALTH CARE FACILITY: ICD-10-CM

## 2024-05-10 DIAGNOSIS — I10 ESSENTIAL (PRIMARY) HYPERTENSION: ICD-10-CM

## 2024-05-10 DIAGNOSIS — E03.9 HYPOTHYROIDISM, UNSPECIFIED TYPE: ICD-10-CM

## 2024-05-10 DIAGNOSIS — F41.9 ANXIETY: ICD-10-CM

## 2024-05-10 DIAGNOSIS — Z11.3 SCREENING FOR STD (SEXUALLY TRANSMITTED DISEASE): ICD-10-CM

## 2024-05-10 DIAGNOSIS — E88.819 INSULIN RESISTANCE: ICD-10-CM

## 2024-05-10 DIAGNOSIS — R39.15 URGENCY OF URINATION: ICD-10-CM

## 2024-05-10 DIAGNOSIS — F51.01 PRIMARY INSOMNIA: ICD-10-CM

## 2024-05-10 LAB
EST. AVERAGE GLUCOSE BLD GHB EST-MCNC: 94 MG/DL
HBA1C MFR BLD: 4.9 %
HBV CORE AB SER QL: NONREACTIVE
HBV SURFACE AG SERPL QL IA: NONREACTIVE
HCV AB SER QL: NONREACTIVE
HIV 1+2 AB+HIV1 P24 AG SERPL QL IA: NONREACTIVE
TREPONEMA PALLIDUM IGG+IGM AB [PRESENCE] IN SERUM OR PLASMA BY IMMUNOASSAY: NONREACTIVE

## 2024-05-10 PROCEDURE — 87389 HIV-1 AG W/HIV-1&-2 AB AG IA: CPT

## 2024-05-10 PROCEDURE — 86780 TREPONEMA PALLIDUM: CPT

## 2024-05-10 PROCEDURE — 36415 COLL VENOUS BLD VENIPUNCTURE: CPT

## 2024-05-10 PROCEDURE — 86704 HEP B CORE ANTIBODY TOTAL: CPT

## 2024-05-10 PROCEDURE — 87340 HEPATITIS B SURFACE AG IA: CPT

## 2024-05-10 PROCEDURE — 83036 HEMOGLOBIN GLYCOSYLATED A1C: CPT

## 2024-05-10 PROCEDURE — 86803 HEPATITIS C AB TEST: CPT

## 2024-09-19 ENCOUNTER — TELEPHONE (OUTPATIENT)
Dept: OBSTETRICS AND GYNECOLOGY | Facility: CLINIC | Age: 34
End: 2024-09-19
Payer: COMMERCIAL

## 2024-09-19 NOTE — TELEPHONE ENCOUNTER
Est pt last seen 10/2023. Patient called stating that for the last 3 days she has had urinary frequency and burning. Patient is requesting an antibiotic. Macrobid 100mg #1 BID for 7 days called to pharmacy on file.

## 2024-11-08 NOTE — PROGRESS NOTES
Annual  Subjective   HPI:  Danica Gonzalez is a 33 y.o. female  Patient's last menstrual period was 2024 (exact date). for annual exam.    Complaints:   none  Periods: regular   Dysmenorrhea:  none    GYNH:    Sexual activity   currently  Current contraception   OCP  Last pap  2021  History of abnormal pap smear   no    OB History          0    Para   0    Term   0       0    AB   0    Living   0         SAB   0    IAB   0    Ectopic   0    Multiple   0    Live Births   0                  Past Medical History:   Diagnosis Date    Anxiety     Hashimoto's disease     Otitis media, unspecified, unspecified ear     Otitis media    Palpitations     Weight gain        Past Surgical History:   Procedure Laterality Date    NM LASIX RENOGRAM      both eyes    OTHER SURGICAL HISTORY  11/15/2013    Dental Surgery    TONSILLECTOMY  11/15/2013    Tonsillectomy    TONSILLECTOMY      10/2013    TYMPANOSTOMY TUBE PLACEMENT  2013    Ear Pressure Equalization Tube       Prior to Admission medications    Medication Sig Start Date End Date Taking? Authorizing Provider   cholecalciferol (Vitamin D-3) 25 MCG (1000 UT) capsule Take 1 capsule (25 mcg) by mouth once daily.    Historical Provider, MD   hydrOXYzine HCL (Atarax) 10 mg tablet TAKE 1 TABLET BY MOUTH EVERY 12 HOURS AS NEEDED 24   Monica Briggs MD   levonorgestreL-ethinyl estrad (Larissia) 0.1-20 mg-mcg tablet Take 1 tablet by mouth once daily. 10/20/23 10/19/24  Juliann Crespo MD   oxybutynin XL (Ditropan-XL) 10 mg 24 hr tablet Take 1 tablet (10 mg) by mouth once daily. 24  Monica Briggs MD   Ozempic 2 mg/dose (8 mg/3 mL) pen injector INJECT 2 MG SUBCUTANEOUSLY WEEKLY 24   Monica Briggs MD   propranolol (Inderal) 20 mg tablet Take 1 tablet (20 mg) by mouth 2 times a day. 24  Monica Briggs MD   thyroid, pork, ([place] Thyroid) 15 mg tablet Take 1 tablet (15 mg) by mouth once daily. on an empty stomach 24    "Monica Briggs MD   thyroid, pork, ([place] Thyroid) 30 mg tablet Take 1 tablet (30 mg) by mouth once daily. on an empty stomach 5/2/24   Monica Briggs MD   traZODone (Desyrel) 50 mg tablet Take 1 tablet (50 mg) by mouth once daily at bedtime. 5/2/24 5/2/25  Monica Briggs MD   vitamin E mixed (VITAMIN E COMPLEX ORAL) Vitamin E Complex    Historical Provider, MD   Xiidra 5 % dropperette INSTILL 1 DROP IN THE RIGHT EYE TWICE DAILY 11/1/23   Historical Provider, MD       Social History     Tobacco Use    Smoking status: Some Days     Types: Cigarettes     Passive exposure: Never    Smokeless tobacco: Never    Tobacco comments:     Patient vapes   Vaping Use    Vaping status: Every Day    Substances: Nicotine    Devices: Disposable, Pre-filled or refillable cartridge   Substance Use Topics    Alcohol use: Yes     Comment: socially    Drug use: Never        Objective   /74   Ht 1.651 m (5' 5\")   Wt 57.1 kg (125 lb 12.8 oz)   LMP 11/06/2024 (Exact Date)   BMI 20.93 kg/m²    General:   Alert and oriented, in no acute distress   Neck: Supple. No visible thyromegaly.    Breast/Axilla: Normal to palpation bilaterally without masses, skin changes, or nipple discharge.    Abdomen: Soft, non-tender, without masses or organomegaly   Vulva: Normal architecture without erythema, masses, or lesions.    Vagina: Normal mucosa without lesions, masses, or atrophy. No abnormal vaginal discharge.    Cervix: Normal without masses, lesions, or signs of cervicitis   Uterus: Normal, mobile, non-enlarged uterus   Adnexa: Normal without masses or lesions   Pelvic Floor normal   Psych Normal affect. Normal mood.      Assessment/Plan   Diagnoses and all orders for this visit:  Encounter for gynecological examination without abnormal finding  -     THINPREP PAP TEST  Screening mammogram for breast cancer  -     BI mammo bilateral screening tomosynthesis; Future  Vaginal discharge  -     C. trachomatis / N. gonorrhoeae, Amplified; " Future  -     HIV 1/2 Antigen/Antibody Screen with Reflex to Confirmation; Future  -     Syphilis Screen with Reflex; Future  -     Hepatitis Panel, Acute; Future  Routine annual  Pap due    STI screening as indicated  Birth control counseling: Barrier methods of contraception for prevention of STIs.   Preventative hygiene habits.  Health promoting habits: Exercise: 30-60 minutes 3 to 5 days/week. Diet: Healthy diet and drink plenty of water each day.      Juliann Crespo MD

## 2024-11-11 ENCOUNTER — LAB (OUTPATIENT)
Dept: LAB | Facility: LAB | Age: 34
End: 2024-11-11
Payer: COMMERCIAL

## 2024-11-11 ENCOUNTER — OFFICE VISIT (OUTPATIENT)
Dept: OBSTETRICS AND GYNECOLOGY | Facility: CLINIC | Age: 34
End: 2024-11-11
Payer: COMMERCIAL

## 2024-11-11 VITALS
SYSTOLIC BLOOD PRESSURE: 116 MMHG | DIASTOLIC BLOOD PRESSURE: 74 MMHG | BODY MASS INDEX: 20.96 KG/M2 | WEIGHT: 125.8 LBS | HEIGHT: 65 IN

## 2024-11-11 DIAGNOSIS — N89.8 VAGINAL DISCHARGE: ICD-10-CM

## 2024-11-11 DIAGNOSIS — Z01.419 ENCOUNTER FOR GYNECOLOGICAL EXAMINATION WITHOUT ABNORMAL FINDING: Primary | ICD-10-CM

## 2024-11-11 DIAGNOSIS — Z12.31 SCREENING MAMMOGRAM FOR BREAST CANCER: ICD-10-CM

## 2024-11-11 LAB
HAV IGM SER QL: NONREACTIVE
HBV CORE IGM SER QL: NONREACTIVE
HBV SURFACE AG SERPL QL IA: NONREACTIVE
HCV AB SER QL: NONREACTIVE
HIV 1+2 AB+HIV1 P24 AG SERPL QL IA: NONREACTIVE
TREPONEMA PALLIDUM IGG+IGM AB [PRESENCE] IN SERUM OR PLASMA BY IMMUNOASSAY: NONREACTIVE

## 2024-11-11 PROCEDURE — 3044F HG A1C LEVEL LT 7.0%: CPT | Performed by: OBSTETRICS & GYNECOLOGY

## 2024-11-11 PROCEDURE — 99395 PREV VISIT EST AGE 18-39: CPT | Performed by: OBSTETRICS & GYNECOLOGY

## 2024-11-11 PROCEDURE — 36415 COLL VENOUS BLD VENIPUNCTURE: CPT

## 2024-11-11 PROCEDURE — 3078F DIAST BP <80 MM HG: CPT | Performed by: OBSTETRICS & GYNECOLOGY

## 2024-11-11 PROCEDURE — 3074F SYST BP LT 130 MM HG: CPT | Performed by: OBSTETRICS & GYNECOLOGY

## 2024-11-11 PROCEDURE — 87491 CHLMYD TRACH DNA AMP PROBE: CPT | Performed by: OBSTETRICS & GYNECOLOGY

## 2024-11-11 PROCEDURE — 80074 ACUTE HEPATITIS PANEL: CPT

## 2024-11-11 PROCEDURE — 86780 TREPONEMA PALLIDUM: CPT

## 2024-11-11 PROCEDURE — 4004F PT TOBACCO SCREEN RCVD TLK: CPT | Performed by: OBSTETRICS & GYNECOLOGY

## 2024-11-11 PROCEDURE — 3008F BODY MASS INDEX DOCD: CPT | Performed by: OBSTETRICS & GYNECOLOGY

## 2024-11-11 PROCEDURE — 87389 HIV-1 AG W/HIV-1&-2 AB AG IA: CPT

## 2024-11-11 ASSESSMENT — LIFESTYLE VARIABLES
SKIP TO QUESTIONS 9-10: 1
AUDIT-C TOTAL SCORE: 2
HOW OFTEN DO YOU HAVE SIX OR MORE DRINKS ON ONE OCCASION: NEVER
HOW OFTEN DO YOU HAVE A DRINK CONTAINING ALCOHOL: 2-4 TIMES A MONTH
HOW MANY STANDARD DRINKS CONTAINING ALCOHOL DO YOU HAVE ON A TYPICAL DAY: 1 OR 2

## 2024-11-11 ASSESSMENT — PATIENT HEALTH QUESTIONNAIRE - PHQ9
1. LITTLE INTEREST OR PLEASURE IN DOING THINGS: NOT AT ALL
2. FEELING DOWN, DEPRESSED OR HOPELESS: NOT AT ALL
SUM OF ALL RESPONSES TO PHQ9 QUESTIONS 1 & 2: 0

## 2024-11-11 ASSESSMENT — ENCOUNTER SYMPTOMS
OCCASIONAL FEELINGS OF UNSTEADINESS: 0
LOSS OF SENSATION IN FEET: 0
DEPRESSION: 0

## 2024-11-11 ASSESSMENT — PAIN SCALES - GENERAL: PAINLEVEL_OUTOF10: 0-NO PAIN

## 2024-11-12 LAB
C TRACH RRNA SPEC QL NAA+PROBE: NEGATIVE
N GONORRHOEA DNA SPEC QL PROBE+SIG AMP: NEGATIVE

## 2024-11-24 DIAGNOSIS — Z01.419 ENCOUNTER FOR GYNECOLOGICAL EXAMINATION WITHOUT ABNORMAL FINDING: ICD-10-CM

## 2024-11-25 LAB
CYTOLOGY CMNT CVX/VAG CYTO-IMP: NORMAL
HPV HR 12 DNA GENITAL QL NAA+PROBE: POSITIVE
HPV HR GENOTYPES PNL CVX NAA+PROBE: POSITIVE
HPV16 DNA SPEC QL NAA+PROBE: NEGATIVE
HPV18 DNA SPEC QL NAA+PROBE: NEGATIVE
LAB AP HPV GENOTYPE QUESTION: YES
LAB AP HPV HR: NORMAL
LABORATORY COMMENT REPORT: NORMAL
PATH REPORT.TOTAL CANCER: NORMAL

## 2024-11-25 RX ORDER — TIMOLOL MALEATE 5 MG/ML
1 SOLUTION/ DROPS OPHTHALMIC DAILY
Qty: 84 TABLET | Refills: 3 | Status: SHIPPED | OUTPATIENT
Start: 2024-11-25

## 2024-12-11 ENCOUNTER — APPOINTMENT (OUTPATIENT)
Dept: RADIOLOGY | Facility: HOSPITAL | Age: 34
End: 2024-12-11
Payer: COMMERCIAL

## 2024-12-13 ENCOUNTER — HOSPITAL ENCOUNTER (OUTPATIENT)
Dept: RADIOLOGY | Facility: HOSPITAL | Age: 34
Discharge: HOME | End: 2024-12-13
Payer: COMMERCIAL

## 2024-12-13 ENCOUNTER — LAB (OUTPATIENT)
Dept: LAB | Facility: LAB | Age: 34
End: 2024-12-13
Payer: COMMERCIAL

## 2024-12-13 DIAGNOSIS — Z12.31 SCREENING MAMMOGRAM FOR BREAST CANCER: ICD-10-CM

## 2024-12-13 DIAGNOSIS — L63.8 OTHER ALOPECIA AREATA: Primary | ICD-10-CM

## 2024-12-13 LAB
BASOPHILS # BLD AUTO: 0.05 X10*3/UL (ref 0–0.1)
BASOPHILS NFR BLD AUTO: 0.6 %
EOSINOPHIL # BLD AUTO: 0.07 X10*3/UL (ref 0–0.7)
EOSINOPHIL NFR BLD AUTO: 0.9 %
ERYTHROCYTE [DISTWIDTH] IN BLOOD BY AUTOMATED COUNT: 12.7 % (ref 11.5–14.5)
FERRITIN SERPL-MCNC: 40 NG/ML (ref 8–150)
HCT VFR BLD AUTO: 39.4 % (ref 36–46)
HGB BLD-MCNC: 13.2 G/DL (ref 12–16)
IMM GRANULOCYTES # BLD AUTO: 0.02 X10*3/UL (ref 0–0.7)
IMM GRANULOCYTES NFR BLD AUTO: 0.2 % (ref 0–0.9)
LYMPHOCYTES # BLD AUTO: 2.37 X10*3/UL (ref 1.2–4.8)
LYMPHOCYTES NFR BLD AUTO: 29.4 %
MCH RBC QN AUTO: 30.3 PG (ref 26–34)
MCHC RBC AUTO-ENTMCNC: 33.5 G/DL (ref 32–36)
MCV RBC AUTO: 91 FL (ref 80–100)
MONOCYTES # BLD AUTO: 0.53 X10*3/UL (ref 0.1–1)
MONOCYTES NFR BLD AUTO: 6.6 %
NEUTROPHILS # BLD AUTO: 5.01 X10*3/UL (ref 1.2–7.7)
NEUTROPHILS NFR BLD AUTO: 62.3 %
NRBC BLD-RTO: 0 /100 WBCS (ref 0–0)
PLATELET # BLD AUTO: 250 X10*3/UL (ref 150–450)
RBC # BLD AUTO: 4.35 X10*6/UL (ref 4–5.2)
WBC # BLD AUTO: 8.1 X10*3/UL (ref 4.4–11.3)

## 2024-12-13 PROCEDURE — 84402 ASSAY OF FREE TESTOSTERONE: CPT

## 2024-12-13 PROCEDURE — 84445 ASSAY OF TSI GLOBULIN: CPT

## 2024-12-13 PROCEDURE — 77067 SCR MAMMO BI INCL CAD: CPT

## 2024-12-13 PROCEDURE — 85025 COMPLETE CBC W/AUTO DIFF WBC: CPT

## 2024-12-13 PROCEDURE — 84630 ASSAY OF ZINC: CPT

## 2024-12-13 PROCEDURE — 82728 ASSAY OF FERRITIN: CPT

## 2024-12-13 PROCEDURE — 36415 COLL VENOUS BLD VENIPUNCTURE: CPT

## 2024-12-13 PROCEDURE — 82306 VITAMIN D 25 HYDROXY: CPT

## 2024-12-13 PROCEDURE — 84182 PROTEIN WESTERN BLOT TEST: CPT

## 2024-12-13 PROCEDURE — 82626 DEHYDROEPIANDROSTERONE: CPT

## 2024-12-14 LAB — 25(OH)D3 SERPL-MCNC: 29 NG/ML (ref 30–100)

## 2024-12-16 LAB — ZINC SERPL-MCNC: 67.5 UG/DL (ref 60–120)

## 2024-12-17 LAB
HU1 AB SER QL IB: NEGATIVE
HU2 AB SER QL IB: NEGATIVE
PCA-TR AB SER QL IB: NEGATIVE
PURKINJE CELLS AB SER QL IB: NEGATIVE

## 2024-12-18 LAB
DHEA SERPL-MCNC: 2.33 NG/ML (ref 1.33–7.78)
THYROID STIMULATING IMMUNOGLOBULIN: <89 % BASELINE

## 2024-12-19 ENCOUNTER — PROCEDURE VISIT (OUTPATIENT)
Dept: OBSTETRICS AND GYNECOLOGY | Facility: CLINIC | Age: 34
End: 2024-12-19
Payer: COMMERCIAL

## 2024-12-19 VITALS
DIASTOLIC BLOOD PRESSURE: 74 MMHG | SYSTOLIC BLOOD PRESSURE: 118 MMHG | HEIGHT: 65 IN | WEIGHT: 127 LBS | BODY MASS INDEX: 21.16 KG/M2

## 2024-12-19 DIAGNOSIS — R87.612 LGSIL ON PAP SMEAR OF CERVIX: Primary | ICD-10-CM

## 2024-12-19 DIAGNOSIS — B97.7 HPV (HUMAN PAPILLOMA VIRUS) INFECTION: ICD-10-CM

## 2024-12-19 LAB
TESTOSTERONE FREE (CHAN): 0.7 PG/ML (ref 0.1–6.4)
TESTOSTERONE,TOTAL,LC-MS/MS: 15 NG/DL (ref 2–45)

## 2024-12-19 PROCEDURE — 57456 ENDOCERV CURETTAGE W/SCOPE: CPT | Performed by: OBSTETRICS & GYNECOLOGY

## 2024-12-19 ASSESSMENT — LIFESTYLE VARIABLES
HOW OFTEN DO YOU HAVE A DRINK CONTAINING ALCOHOL: MONTHLY OR LESS
SKIP TO QUESTIONS 9-10: 1
AUDIT-C TOTAL SCORE: 1
HOW MANY STANDARD DRINKS CONTAINING ALCOHOL DO YOU HAVE ON A TYPICAL DAY: 1 OR 2
HOW OFTEN DO YOU HAVE SIX OR MORE DRINKS ON ONE OCCASION: NEVER

## 2024-12-19 ASSESSMENT — PATIENT HEALTH QUESTIONNAIRE - PHQ9
1. LITTLE INTEREST OR PLEASURE IN DOING THINGS: NOT AT ALL
SUM OF ALL RESPONSES TO PHQ9 QUESTIONS 1 & 2: 0
2. FEELING DOWN, DEPRESSED OR HOPELESS: NOT AT ALL

## 2024-12-19 ASSESSMENT — ENCOUNTER SYMPTOMS
LOSS OF SENSATION IN FEET: 0
OCCASIONAL FEELINGS OF UNSTEADINESS: 0
DEPRESSION: 0

## 2024-12-19 ASSESSMENT — PAIN SCALES - GENERAL: PAINLEVEL_OUTOF10: 0-NO PAIN

## 2024-12-19 NOTE — PROGRESS NOTES
Colposcopy    Date/Time: 12/19/2024 11:50 AM    Performed by: Juliann Crespo MD  Authorized by: Juliann Crespo MD    Procedure location: cervix    Consent:     Patient questions answered: yes      Risks and benefits of the procedure and its alternatives discussed: yes      Consent obtained:  Written    Consent given by:  Patient  Indication:     Cervical indication(s): high-risk HPV positive and CASSI 2    Pre-procedure:     Speculum was placed in the vagina: yes      Prep solution(s): acetic acid    Procedure:     Colposcopy with: endocervical curettage      Colposcopy details:  No HANNAH or vascular changes    Cervix visibility: fully visualized      SCJ visibility: fully visualized      Lesion visualized: fully visualized      Cervical impression: normal/benign    Post-procedure:     Patient tolerance of procedure:  Patient tolerated the procedure well with no immediate complications

## 2024-12-26 LAB
LABORATORY COMMENT REPORT: NORMAL
PATH REPORT.FINAL DX SPEC: NORMAL
PATH REPORT.GROSS SPEC: NORMAL
PATH REPORT.RELEVANT HX SPEC: NORMAL
PATH REPORT.TOTAL CANCER: NORMAL

## 2025-01-23 ENCOUNTER — TELEPHONE (OUTPATIENT)
Dept: OBSTETRICS AND GYNECOLOGY | Facility: CLINIC | Age: 35
End: 2025-01-23
Payer: COMMERCIAL

## 2025-01-23 NOTE — TELEPHONE ENCOUNTER
Pt with a yeast infection and requesting a script for diflucan.  Pt c/o itching and white discharge.  Diflucan 150mg #1 as directed with no refill called to [facility]

## 2025-02-04 LAB
T3FREE SERPL-MCNC: 2.9 PG/ML (ref 2.3–4.2)
TSH SERPL-ACNC: 1.04 MIU/L

## 2025-02-06 ENCOUNTER — APPOINTMENT (OUTPATIENT)
Dept: HEMATOLOGY/ONCOLOGY | Facility: CLINIC | Age: 35
End: 2025-02-06
Payer: COMMERCIAL

## 2025-02-06 ENCOUNTER — LAB (OUTPATIENT)
Dept: LAB | Facility: CLINIC | Age: 35
End: 2025-02-06
Payer: COMMERCIAL

## 2025-02-06 ENCOUNTER — OFFICE VISIT (OUTPATIENT)
Dept: HEMATOLOGY/ONCOLOGY | Facility: CLINIC | Age: 35
End: 2025-02-06
Payer: COMMERCIAL

## 2025-02-06 VITALS
BODY MASS INDEX: 20.12 KG/M2 | RESPIRATION RATE: 17 BRPM | SYSTOLIC BLOOD PRESSURE: 119 MMHG | OXYGEN SATURATION: 98 % | HEART RATE: 72 BPM | DIASTOLIC BLOOD PRESSURE: 82 MMHG | WEIGHT: 120.92 LBS | TEMPERATURE: 97.3 F

## 2025-02-06 DIAGNOSIS — D72.829 LEUKOCYTOSIS, UNSPECIFIED TYPE: Primary | ICD-10-CM

## 2025-02-06 DIAGNOSIS — D72.829 LEUKOCYTOSIS, UNSPECIFIED TYPE: ICD-10-CM

## 2025-02-06 LAB
ALBUMIN SERPL BCP-MCNC: 4 G/DL (ref 3.4–5)
ALP SERPL-CCNC: 42 U/L (ref 33–110)
ALT SERPL W P-5'-P-CCNC: 17 U/L (ref 7–45)
ANION GAP SERPL CALC-SCNC: 12 MMOL/L (ref 10–20)
AST SERPL W P-5'-P-CCNC: 16 U/L (ref 9–39)
BASOPHILS # BLD AUTO: 0.03 X10*3/UL (ref 0–0.1)
BASOPHILS NFR BLD AUTO: 0.5 %
BILIRUB SERPL-MCNC: 1.2 MG/DL (ref 0–1.2)
BUN SERPL-MCNC: 12 MG/DL (ref 6–23)
CALCIUM SERPL-MCNC: 9.1 MG/DL (ref 8.6–10.3)
CHLORIDE SERPL-SCNC: 105 MMOL/L (ref 98–107)
CO2 SERPL-SCNC: 28 MMOL/L (ref 21–32)
CREAT SERPL-MCNC: 0.68 MG/DL (ref 0.5–1.05)
EGFRCR SERPLBLD CKD-EPI 2021: >90 ML/MIN/1.73M*2
EOSINOPHIL # BLD AUTO: 0.13 X10*3/UL (ref 0–0.7)
EOSINOPHIL NFR BLD AUTO: 2.2 %
ERYTHROCYTE [DISTWIDTH] IN BLOOD BY AUTOMATED COUNT: 12.6 % (ref 11.5–14.5)
FERRITIN SERPL-MCNC: 62 NG/ML (ref 8–150)
FOLATE SERPL-MCNC: 12.3 NG/ML
GLUCOSE SERPL-MCNC: 88 MG/DL (ref 74–99)
HCT VFR BLD AUTO: 38.5 % (ref 36–46)
HGB BLD-MCNC: 13 G/DL (ref 12–16)
IMM GRANULOCYTES # BLD AUTO: 0.02 X10*3/UL (ref 0–0.7)
IMM GRANULOCYTES NFR BLD AUTO: 0.3 % (ref 0–0.9)
IRON SATN MFR SERPL: 28 % (ref 25–45)
IRON SERPL-MCNC: 112 UG/DL (ref 35–150)
LYMPHOCYTES # BLD AUTO: 1.94 X10*3/UL (ref 1.2–4.8)
LYMPHOCYTES NFR BLD AUTO: 32.6 %
MCH RBC QN AUTO: 30.5 PG (ref 26–34)
MCHC RBC AUTO-ENTMCNC: 33.8 G/DL (ref 32–36)
MCV RBC AUTO: 90 FL (ref 80–100)
MONOCYTES # BLD AUTO: 0.5 X10*3/UL (ref 0.1–1)
MONOCYTES NFR BLD AUTO: 8.4 %
NEUTROPHILS # BLD AUTO: 3.33 X10*3/UL (ref 1.2–7.7)
NEUTROPHILS NFR BLD AUTO: 56 %
NRBC BLD-RTO: 0 /100 WBCS (ref 0–0)
PLATELET # BLD AUTO: 210 X10*3/UL (ref 150–450)
POTASSIUM SERPL-SCNC: 3.9 MMOL/L (ref 3.5–5.3)
PROT SERPL-MCNC: 6.7 G/DL (ref 6.4–8.2)
RBC # BLD AUTO: 4.26 X10*6/UL (ref 4–5.2)
SODIUM SERPL-SCNC: 141 MMOL/L (ref 136–145)
TIBC SERPL-MCNC: 398 UG/DL (ref 240–445)
UIBC SERPL-MCNC: 286 UG/DL (ref 110–370)
VIT B12 SERPL-MCNC: 398 PG/ML (ref 211–911)
WBC # BLD AUTO: 6 X10*3/UL (ref 4.4–11.3)

## 2025-02-06 PROCEDURE — 82728 ASSAY OF FERRITIN: CPT

## 2025-02-06 PROCEDURE — 3079F DIAST BP 80-89 MM HG: CPT | Performed by: INTERNAL MEDICINE

## 2025-02-06 PROCEDURE — 3074F SYST BP LT 130 MM HG: CPT | Performed by: INTERNAL MEDICINE

## 2025-02-06 PROCEDURE — 84075 ASSAY ALKALINE PHOSPHATASE: CPT

## 2025-02-06 PROCEDURE — 99215 OFFICE O/P EST HI 40 MIN: CPT | Performed by: INTERNAL MEDICINE

## 2025-02-06 PROCEDURE — 83550 IRON BINDING TEST: CPT

## 2025-02-06 PROCEDURE — 85025 COMPLETE CBC W/AUTO DIFF WBC: CPT

## 2025-02-06 PROCEDURE — 82746 ASSAY OF FOLIC ACID SERUM: CPT

## 2025-02-06 PROCEDURE — 36415 COLL VENOUS BLD VENIPUNCTURE: CPT

## 2025-02-06 PROCEDURE — 82607 VITAMIN B-12: CPT

## 2025-02-06 ASSESSMENT — PAIN SCALES - GENERAL: PAINLEVEL_OUTOF10: 0-NO PAIN

## 2025-02-06 NOTE — PROGRESS NOTES
Pt seen in office today for an established patient/ new to provider follow up visit with Dr. Emily Waldron for management of her leukocytosis. She was last seen in our office on 2/1/24 by Michael Vicente CNP. She is without complaints today and denies pain.     Medications, pharmacy preference and allergies were reviewed with patient and updated in the medical record by MD.     Per orders, labs were obtained in office today prior to her visit. Per Dr. Waldron, she no longer needs to be seen in our office, however she has been encouraged to contact us with any new needs or concerns in the future.    Our contact information was given to patient and they were encouraged to contact us with any questions or concerns.     Patient verbalized understanding and agreement regarding discussed information via verbal feedback. Pt escorted to scheduling.

## 2025-02-06 NOTE — PROGRESS NOTES
Patient ID: Danica Gonzalez is a 34 y.o. female.    Hematology    Leukocytosis:   - WBC 13.7 with an absolute neutrophil count of 9.69 and the absolute eosinophil count was 0.61   - WBC 13.4 with an absolute neutrophil count of 9.10 and eosinophil count at 1.0.   2020 - WBC 15.2 with an absolute neutrophil count at 10.1 and eosinophil count at 0.86.   2020 - WBC 12 with an absolute neutrophil count of 7.87.  2020 - WBC 11.5 but otherwise with normal neutrophils, lymphocytes eosinophils, monocytes and basophils.  2022 - WBC 15.4 neutrophil 10.26 otherwise WNL  2023 - WBC 12.2 neutrophil 8.52 otherwise WNL  2024 - WBC 8.7 ANC 5440 (CBCd WNL)     Interval History:  Patient presents today for routine follow up evaluation for history of leukocytosis.  We have reviewed her counts dating back to .  No recent infections. She  has not had any hospitalization.  She denies any fever, chills or night sweats.  No cough, chest pain or shortness of breath.  No nausea or vomiting.  No constipation or diarrhea.     Review of Systems:  A review of systems has been completed and are negative for complaints except what is stated in the HPI and/or past medical history    Past Medical History:  Tobacco abuse (quit 2020 now smoking socially), hypertension, anxiety, hypothyroidism, insomnia, PCOS. COVID 2021.    Past Surgical History:  Tonsillectomy     Allergies:   NKDA    Medications:  Medication list reviewed with patient and updated in EMR    Social History:   The patient quit smoking in 2020 after 15 years. She drinks alcohol occasionally   social smoking weekends.  vapes    Vital Signs:   /82   Pulse 72   Temp 36.3 °C (97.3 °F) (Temporal)   Resp 17   Wt 54.9 kg (120 lb 14.8 oz)   SpO2 98%   BMI 20.12 kg/m²     Physical Exam:  ECO  Pain: 0  Constitutional: Well developed, awake/alert/oriented x3, no distress, alert and cooperative  Eyes: PER. sclera  anicteric  ENMT: Oral mucosa moist  Respiratory/Thorax: Breathing is non-labored. Lungs are clear to auscultation bilaterally. No adventitious breath sounds  Cardiovascular: S1-S2. Regular rate and rhythm. No murmurs, rubs, or gallops appreciated  Gastrointestinal: Abdomen soft nontender, nondistended, normal active bowel sounds.   Musculoskeletal: ROM intact, no joint swelling, normal strength  Extremities: normal extremities, no cyanosis, no edema, no clubbing  Neurologic: alert and oriented x3. Nonfocal exam. No myoclonus  Psychological: Pleasant, appropriate and easily engaged     Lab Results:  February 1, 2024   WBC 8.7, hemoglobin 13.4, hematocrit 40.3, platelets 222,000, ANC 5440     Assessment:  Chronic leukocytosis  - currently WNL     History of tobacco abuse/use    There is no need for further hematology follow up     Emily Waldron MD

## 2025-02-21 ENCOUNTER — APPOINTMENT (OUTPATIENT)
Dept: ENDOCRINOLOGY | Facility: CLINIC | Age: 35
End: 2025-02-21
Payer: COMMERCIAL

## 2025-02-21 VITALS
WEIGHT: 122 LBS | HEART RATE: 100 BPM | SYSTOLIC BLOOD PRESSURE: 104 MMHG | BODY MASS INDEX: 20.3 KG/M2 | DIASTOLIC BLOOD PRESSURE: 71 MMHG

## 2025-02-21 DIAGNOSIS — E03.9 HYPOTHYROIDISM, UNSPECIFIED TYPE: Primary | ICD-10-CM

## 2025-02-21 PROCEDURE — 99213 OFFICE O/P EST LOW 20 MIN: CPT | Performed by: INTERNAL MEDICINE

## 2025-02-21 PROCEDURE — 3078F DIAST BP <80 MM HG: CPT | Performed by: INTERNAL MEDICINE

## 2025-02-21 PROCEDURE — 3074F SYST BP LT 130 MM HG: CPT | Performed by: INTERNAL MEDICINE

## 2025-02-21 RX ORDER — THYROID 15 MG/1
15 TABLET ORAL DAILY
Qty: 90 TABLET | Refills: 3 | Status: SHIPPED | OUTPATIENT
Start: 2025-02-21

## 2025-02-21 RX ORDER — THYROID 30 MG/1
30 TABLET ORAL DAILY
Qty: 90 TABLET | Refills: 3 | Status: SHIPPED | OUTPATIENT
Start: 2025-02-21

## 2025-02-21 ASSESSMENT — PATIENT HEALTH QUESTIONNAIRE - PHQ9
SUM OF ALL RESPONSES TO PHQ9 QUESTIONS 1 & 2: 0
2. FEELING DOWN, DEPRESSED OR HOPELESS: NOT AT ALL
1. LITTLE INTEREST OR PLEASURE IN DOING THINGS: NOT AT ALL

## 2025-02-21 ASSESSMENT — PAIN SCALES - GENERAL: PAINLEVEL_OUTOF10: 0-NO PAIN

## 2025-02-21 NOTE — PROGRESS NOTES
HPI   33 yo with hashimoto's disease here for yearly f/u.           Pt been on synthetic thyroid hormone in the past, transitioned to dessicated thyroid hormone and felt better. Has gone back and forth with pcp and endo's over the years to regulate.           Taking armour 30mg + 15mg, tsh-wnl, see below.   -no obstructive sx, euthyroid by history   -remains on ocp     -taking ozempic 2mg (wt nearly 240lbs, today 122 lbs) per pcp  -having normal periods, off metformin      Current Outpatient Medications:     cholecalciferol (Vitamin D-3) 25 MCG (1000 UT) capsule, Take 1 capsule (25 mcg) by mouth once daily., Disp: , Rfl:     hydrOXYzine HCL (Atarax) 10 mg tablet, TAKE 1 TABLET BY MOUTH EVERY 12 HOURS AS NEEDED, Disp: 180 tablet, Rfl: 3    oxybutynin XL (Ditropan-XL) 10 mg 24 hr tablet, Take 1 tablet (10 mg) by mouth once daily., Disp: 90 tablet, Rfl: 3    propranolol (Inderal) 20 mg tablet, Take 1 tablet (20 mg) by mouth 2 times a day., Disp: 180 tablet, Rfl: 3    [START ON 2/23/2025] semaglutide (Ozempic) 2 mg/dose (8 mg/3 mL) pen injector, INJECT 2 MG SUBCUTANEOUSLY WEEKLY, Disp: 9 mL, Rfl: 3    thyroid, pork, ([place] Thyroid) 15 mg tablet, Take 1 tablet (15 mg) by mouth once daily. on an empty stomach, Disp: 90 tablet, Rfl: 3    thyroid, pork, ([place] Thyroid) 30 mg tablet, Take 1 tablet (30 mg) by mouth once daily. on an empty stomach, Disp: 90 tablet, Rfl: 3    traZODone (Desyrel) 50 mg tablet, Take 1 tablet (50 mg) by mouth once daily at bedtime., Disp: 90 tablet, Rfl: 3    Vienva 0.1-20 mg-mcg tablet, TAKE 1 TABLET BY MOUTH EVERY DAY, Disp: 84 tablet, Rfl: 3    vitamin E mixed (VITAMIN E COMPLEX ORAL), Vitamin E Complex, Disp: , Rfl:     Xiidra 5 % dropperette, INSTILL 1 DROP IN THE RIGHT EYE TWICE DAILY, Disp: , Rfl:       Allergies as of 02/21/2025 - Reviewed 02/21/2025   Allergen Reaction Noted    Buspirone Anxiety 08/26/2023         Review of Systems   Cardiology: Lightheadedness-denies.  Chest  "pain-denies.  Leg edema-denies.  Palpitations-denies.  Respiratory: Cough-denies. Shortness of breath-denies.  Wheezing-denies.  Gastroenterology: Constipation-denies.  Diarrhea-denies.  Heartburn-denies.  Endocrinology: Cold intolerance-denies.  Heat intolerance-denies.  Sweats-denies.  Neurology: Headache-denies.  Tremor-denies.  Neuropathy in extremities-denies.  Psychology: Low energy-denies.  Irritability-denies.  Sleep disturbances-denies.      /71 (BP Location: Right arm, Patient Position: Sitting)   Pulse 100   Wt 55.3 kg (122 lb)   BMI 20.30 kg/m²       Labs:  Lab Results   Component Value Date    WBC 6.0 02/06/2025    NRBC 0.0 02/06/2025    RBC 4.26 02/06/2025    HGB 13.0 02/06/2025    HCT 38.5 02/06/2025     02/06/2025     Lab Results   Component Value Date    CALCIUM 9.1 02/06/2025    AST 16 02/06/2025    ALKPHOS 42 02/06/2025    BILITOT 1.2 02/06/2025    PROT 6.7 02/06/2025    ALBUMIN 4.0 02/06/2025    GLOB 2.5 02/26/2022    AGR 1.6 02/26/2022     02/06/2025    K 3.9 02/06/2025     02/06/2025    CO2 28 02/06/2025    ANIONGAP 12 02/06/2025    BUN 12 02/06/2025    CREATININE 0.68 02/06/2025    UREACREAUR 14.3 11/04/2022    GLUCOSE 88 02/06/2025    ALT 17 02/06/2025    EGFR >90 02/06/2025     Lab Results   Component Value Date    CHOL 202 (H) 02/10/2023    TRIG 156 (H) 02/10/2023    HDL 35 (L) 02/10/2023    LDLCALC 136 (H) 02/10/2023     No results found for: \"MICROALBCREA\"  Lab Results   Component Value Date    TSH 1.04 02/03/2025     Lab Results   Component Value Date    XIHQKYHB67 398 02/06/2025     Lab Results   Component Value Date    HGBA1C 4.9 05/10/2024         Physical Exam   General Appearance: pleasant, cooperative, no acute distress  HEENT: no chemosis, no proptosis, no lid lag, no lid retraction  Neck: no lymphadenopathy, no thyromegaly, no dominant thyroid nodules  Heart: no murmurs, regular rate and rhythm, S1 and S2  Lungs: no wheezes, no rhonci, no " rales  Extremities: no lower extremity swelling      Assessment/Plan   1. Hypothyroidism, unspecified type (Primary)  -euthyroid on therapy, continue current meds  -sent refills, repeat labs ordered for next year    2) working with pcp with ozempic  -starting to lose too much wt, pt would like to weight in the 130 lbs range  -has pcp follow up for this in the next month         Follow Up:  Chung 1 year    -labs/tests/notes reviewed  -reviewed and counseled patient on medication monitoring and side effects

## 2025-03-03 DIAGNOSIS — E03.9 HYPOTHYROIDISM, UNSPECIFIED TYPE: ICD-10-CM

## 2025-03-03 RX ORDER — THYROID 30 MG/1
30 TABLET ORAL DAILY
Qty: 90 TABLET | Refills: 3 | Status: SHIPPED | OUTPATIENT
Start: 2025-03-03 | End: 2025-03-04 | Stop reason: SDUPTHER

## 2025-03-03 RX ORDER — THYROID 15 MG/1
15 TABLET ORAL DAILY
Qty: 90 TABLET | Refills: 3 | Status: SHIPPED | OUTPATIENT
Start: 2025-03-03 | End: 2025-03-04 | Stop reason: SDUPTHER

## 2025-03-03 NOTE — TELEPHONE ENCOUNTER
We are sending your prescription to [facility] for benefits investigation and affordability support.      If you have any questions or would like to check the status of your prescription(s),  please contact the pharmacy directly at [phone].

## 2025-03-04 DIAGNOSIS — E03.9 HYPOTHYROIDISM, UNSPECIFIED TYPE: ICD-10-CM

## 2025-03-04 RX ORDER — THYROID 30 MG/1
30 TABLET ORAL DAILY
Qty: 90 TABLET | Refills: 3 | Status: SHIPPED | OUTPATIENT
Start: 2025-03-04

## 2025-03-04 RX ORDER — THYROID 15 MG/1
15 TABLET ORAL DAILY
Qty: 90 TABLET | Refills: 3 | Status: SHIPPED | OUTPATIENT
Start: 2025-03-04

## 2025-05-08 ENCOUNTER — OFFICE VISIT (OUTPATIENT)
Dept: PRIMARY CARE | Facility: CLINIC | Age: 35
End: 2025-05-08
Payer: COMMERCIAL

## 2025-05-08 VITALS
WEIGHT: 123 LBS | RESPIRATION RATE: 16 BRPM | OXYGEN SATURATION: 99 % | DIASTOLIC BLOOD PRESSURE: 64 MMHG | HEART RATE: 81 BPM | BODY MASS INDEX: 20.49 KG/M2 | SYSTOLIC BLOOD PRESSURE: 104 MMHG | HEIGHT: 65 IN

## 2025-05-08 DIAGNOSIS — R39.15 URGENCY OF URINATION: ICD-10-CM

## 2025-05-08 DIAGNOSIS — E03.9 HYPOTHYROIDISM (ACQUIRED): ICD-10-CM

## 2025-05-08 DIAGNOSIS — E88.819 INSULIN RESISTANCE: ICD-10-CM

## 2025-05-08 DIAGNOSIS — Z23 ENCOUNTER FOR IMMUNIZATION: ICD-10-CM

## 2025-05-08 DIAGNOSIS — I10 ESSENTIAL (PRIMARY) HYPERTENSION: ICD-10-CM

## 2025-05-08 DIAGNOSIS — Z00.00 ROUTINE GENERAL MEDICAL EXAMINATION AT A HEALTH CARE FACILITY: Primary | ICD-10-CM

## 2025-05-08 DIAGNOSIS — F41.9 ANXIETY: ICD-10-CM

## 2025-05-08 DIAGNOSIS — F51.01 PRIMARY INSOMNIA: ICD-10-CM

## 2025-05-08 PROCEDURE — 3074F SYST BP LT 130 MM HG: CPT | Performed by: INTERNAL MEDICINE

## 2025-05-08 PROCEDURE — 90651 9VHPV VACCINE 2/3 DOSE IM: CPT | Performed by: INTERNAL MEDICINE

## 2025-05-08 PROCEDURE — 99395 PREV VISIT EST AGE 18-39: CPT | Performed by: INTERNAL MEDICINE

## 2025-05-08 PROCEDURE — 3008F BODY MASS INDEX DOCD: CPT | Performed by: INTERNAL MEDICINE

## 2025-05-08 PROCEDURE — 3078F DIAST BP <80 MM HG: CPT | Performed by: INTERNAL MEDICINE

## 2025-05-08 PROCEDURE — 90471 IMMUNIZATION ADMIN: CPT | Performed by: INTERNAL MEDICINE

## 2025-05-08 PROCEDURE — 90715 TDAP VACCINE 7 YRS/> IM: CPT | Performed by: INTERNAL MEDICINE

## 2025-05-08 PROCEDURE — 90472 IMMUNIZATION ADMIN EACH ADD: CPT | Performed by: INTERNAL MEDICINE

## 2025-05-08 PROCEDURE — 99213 OFFICE O/P EST LOW 20 MIN: CPT | Performed by: INTERNAL MEDICINE

## 2025-05-08 RX ORDER — HYDROXYZINE HYDROCHLORIDE 25 MG/1
25 TABLET, FILM COATED ORAL EVERY 12 HOURS PRN
Qty: 180 TABLET | Refills: 3 | Status: SHIPPED | OUTPATIENT
Start: 2025-05-08 | End: 2026-05-08

## 2025-05-08 RX ORDER — PROPRANOLOL HYDROCHLORIDE 20 MG/1
20 TABLET ORAL DAILY
Qty: 90 TABLET | Refills: 3 | Status: SHIPPED | OUTPATIENT
Start: 2025-05-08 | End: 2026-05-08

## 2025-05-08 RX ORDER — SEMAGLUTIDE 2.68 MG/ML
2 INJECTION, SOLUTION SUBCUTANEOUS
Qty: 9 ML | Refills: 3 | Status: SHIPPED | OUTPATIENT
Start: 2025-05-11

## 2025-05-08 RX ORDER — OXYBUTYNIN CHLORIDE 10 MG/1
10 TABLET, EXTENDED RELEASE ORAL DAILY
Qty: 90 TABLET | Refills: 3 | Status: SHIPPED | OUTPATIENT
Start: 2025-05-08 | End: 2026-05-08

## 2025-05-08 RX ORDER — TRAZODONE HYDROCHLORIDE 50 MG/1
50 TABLET ORAL NIGHTLY
Qty: 90 TABLET | Refills: 3 | Status: SHIPPED | OUTPATIENT
Start: 2025-05-08 | End: 2026-05-08

## 2025-05-08 ASSESSMENT — ENCOUNTER SYMPTOMS
NAUSEA: 0
FREQUENCY: 0
NERVOUS/ANXIOUS: 0
PALPITATIONS: 0
VOMITING: 0
APPETITE CHANGE: 0
HEMATURIA: 0
ARTHRALGIAS: 0
SORE THROAT: 0
OCCASIONAL FEELINGS OF UNSTEADINESS: 0
FATIGUE: 0
RHINORRHEA: 0
ABDOMINAL PAIN: 0
DIARRHEA: 0
FEVER: 0
JOINT SWELLING: 0
DIFFICULTY URINATING: 0
WEAKNESS: 0
SINUS PAIN: 0
LOSS OF SENSATION IN FEET: 0
SHORTNESS OF BREATH: 0
CONSTIPATION: 0
CHILLS: 0
DIZZINESS: 0
DYSURIA: 0
HEADACHES: 0
COUGH: 0
SLEEP DISTURBANCE: 0
DEPRESSION: 0

## 2025-05-08 ASSESSMENT — PAIN SCALES - GENERAL: PAINLEVEL_OUTOF10: 0-NO PAIN

## 2025-05-08 NOTE — PROGRESS NOTES
Subjective   Patient ID: Danica Gonzalez is a 34 y.o. female who presents for Annual Exam.    She is c/o increased anxiety since she was diagnosed with HPV,     Diagnostics Reviewed:  Labs Reviewed:         Review of Systems   Constitutional:  Negative for appetite change, chills, fatigue and fever.   HENT:  Negative for ear pain, rhinorrhea, sinus pain and sore throat.    Eyes:  Negative for visual disturbance.   Respiratory:  Negative for cough and shortness of breath.    Cardiovascular:  Negative for chest pain and palpitations.   Gastrointestinal:  Negative for abdominal pain, constipation, diarrhea, nausea and vomiting.   Genitourinary:  Negative for difficulty urinating, dysuria, frequency and hematuria.   Musculoskeletal:  Negative for arthralgias and joint swelling.   Skin:  Negative for rash.   Neurological:  Negative for dizziness, weakness and headaches.   Psychiatric/Behavioral:  Negative for sleep disturbance. The patient is not nervous/anxious.      Objective   There were no vitals taken for this visit.    Physical Exam  HENT:      Right Ear: Tympanic membrane normal. There is no impacted cerumen.      Left Ear: Tympanic membrane normal. There is no impacted cerumen.      Mouth/Throat:      Pharynx: Oropharynx is clear. No oropharyngeal exudate.   Eyes:      Conjunctiva/sclera: Conjunctivae normal.      Pupils: Pupils are equal, round, and reactive to light.   Neck:      Thyroid: No thyromegaly.      Vascular: No carotid bruit.   Cardiovascular:      Rate and Rhythm: Regular rhythm.      Heart sounds: Normal heart sounds.   Pulmonary:      Breath sounds: Normal breath sounds.   Chest:   Breasts:     Right: Normal.      Left: Normal.   Abdominal:      Palpations: Abdomen is soft. There is no hepatomegaly.      Tenderness: There is no abdominal tenderness.   Musculoskeletal:      Right lower leg: No edema.      Left lower leg: No edema.   Lymphadenopathy:      Cervical: No cervical adenopathy.   Skin:      General: Skin is warm.      Comments: No suspicious moles   Neurological:      Mental Status: She is alert and oriented to person, place, and time.      Gait: Gait is intact.   Psychiatric:         Mood and Affect: Mood and affect normal.         Behavior: Behavior normal. Behavior is cooperative.         Cognition and Memory: Cognition normal.       Assessment/Plan   There are no diagnoses linked to this encounter.    Scribe Attestation  By signing my name below, IBasilio, Scrkierra   attest that this documentation has been prepared under the direction and in the presence of Monica Briggs MD.

## 2025-05-20 PROBLEM — E11.9 DIABETES MELLITUS WITHOUT COMPLICATION: Status: RESOLVED | Noted: 2023-08-26 | Resolved: 2025-05-20

## 2025-05-20 PROBLEM — E24.9 HYPERCORTISOLISM (MULTI): Status: RESOLVED | Noted: 2023-08-26 | Resolved: 2025-05-20

## 2025-06-20 ENCOUNTER — TELEPHONE (OUTPATIENT)
Dept: OBSTETRICS AND GYNECOLOGY | Facility: CLINIC | Age: 35
End: 2025-06-20
Payer: COMMERCIAL

## 2025-06-20 NOTE — TELEPHONE ENCOUNTER
Patient called this morning with c/o burning with urination and urine frequency. States she's used Macrobid in the past with relief. Advised I would call in Macrobid 100mg take one tablet PO BID x7 days #14 with zero refills. Advised to call the office if her Sx do no clear up once ATB is finished and she will need to come in for urine sample. Patient agrees.

## 2025-07-22 PROCEDURE — 88305 TISSUE EXAM BY PATHOLOGIST: CPT | Performed by: DERMATOLOGY

## 2025-07-25 ENCOUNTER — LAB REQUISITION (OUTPATIENT)
Dept: DERMATOPATHOLOGY | Facility: CLINIC | Age: 35
End: 2025-07-25
Payer: COMMERCIAL

## 2025-07-25 DIAGNOSIS — D48.5 NEOPLASM OF UNCERTAIN BEHAVIOR OF SKIN: ICD-10-CM

## 2025-07-28 LAB
LABORATORY COMMENT REPORT: NORMAL
PATH REPORT.FINAL DX SPEC: NORMAL
PATH REPORT.GROSS SPEC: NORMAL
PATH REPORT.MICROSCOPIC SPEC OTHER STN: NORMAL
PATH REPORT.RELEVANT HX SPEC: NORMAL
PATH REPORT.TOTAL CANCER: NORMAL

## 2025-11-06 ENCOUNTER — APPOINTMENT (OUTPATIENT)
Dept: PRIMARY CARE | Facility: CLINIC | Age: 35
End: 2025-11-06
Payer: COMMERCIAL

## 2026-02-20 ENCOUNTER — APPOINTMENT (OUTPATIENT)
Dept: ENDOCRINOLOGY | Facility: CLINIC | Age: 36
End: 2026-02-20
Payer: COMMERCIAL

## 2026-05-11 ENCOUNTER — APPOINTMENT (OUTPATIENT)
Dept: PRIMARY CARE | Facility: CLINIC | Age: 36
End: 2026-05-11
Payer: COMMERCIAL